# Patient Record
Sex: FEMALE | Race: WHITE | NOT HISPANIC OR LATINO | Employment: PART TIME | URBAN - METROPOLITAN AREA
[De-identification: names, ages, dates, MRNs, and addresses within clinical notes are randomized per-mention and may not be internally consistent; named-entity substitution may affect disease eponyms.]

---

## 2023-08-20 ENCOUNTER — HOSPITAL ENCOUNTER (EMERGENCY)
Facility: HOSPITAL | Age: 53
Discharge: HOME | End: 2023-08-20
Attending: STUDENT IN AN ORGANIZED HEALTH CARE EDUCATION/TRAINING PROGRAM

## 2023-08-20 VITALS
SYSTOLIC BLOOD PRESSURE: 127 MMHG | HEIGHT: 60 IN | OXYGEN SATURATION: 96 % | WEIGHT: 150 LBS | HEART RATE: 83 BPM | DIASTOLIC BLOOD PRESSURE: 99 MMHG | BODY MASS INDEX: 29.45 KG/M2 | RESPIRATION RATE: 18 BRPM | TEMPERATURE: 98 F

## 2023-08-20 DIAGNOSIS — R53.1 WEAKNESS: ICD-10-CM

## 2023-08-20 DIAGNOSIS — R11.2 NAUSEA AND VOMITING, UNSPECIFIED VOMITING TYPE: ICD-10-CM

## 2023-08-20 DIAGNOSIS — N39.0 URINARY TRACT INFECTION WITHOUT HEMATURIA, SITE UNSPECIFIED: Primary | ICD-10-CM

## 2023-08-20 LAB
ALBUMIN SERPL-MCNC: 4.4 G/DL (ref 3.5–5)
ALBUMIN/GLOB SERPL: 1.6 RATIO (ref 1.1–2)
ALP SERPL-CCNC: 71 UNIT/L (ref 40–150)
ALT SERPL-CCNC: 16 UNIT/L (ref 0–55)
APPEARANCE UR: CLEAR
AST SERPL-CCNC: 13 UNIT/L (ref 5–34)
BACTERIA #/AREA URNS AUTO: ABNORMAL /HPF
BASOPHILS # BLD AUTO: 0.03 X10(3)/MCL
BASOPHILS NFR BLD AUTO: 0.3 %
BILIRUB SERPL-MCNC: 0.5 MG/DL
BILIRUB UR QL STRIP.AUTO: NEGATIVE
BUN SERPL-MCNC: 14.3 MG/DL (ref 9.8–20.1)
CALCIUM SERPL-MCNC: 9.9 MG/DL (ref 8.4–10.2)
CHLORIDE SERPL-SCNC: 107 MMOL/L (ref 98–107)
CO2 SERPL-SCNC: 21 MMOL/L (ref 22–29)
COLOR UR: YELLOW
CREAT SERPL-MCNC: 0.89 MG/DL (ref 0.55–1.02)
EOSINOPHIL # BLD AUTO: 0.04 X10(3)/MCL (ref 0–0.9)
EOSINOPHIL NFR BLD AUTO: 0.5 %
ERYTHROCYTE [DISTWIDTH] IN BLOOD BY AUTOMATED COUNT: 13.2 % (ref 11.5–17)
FLUAV AG UPPER RESP QL IA.RAPID: NOT DETECTED
FLUBV AG UPPER RESP QL IA.RAPID: NOT DETECTED
GFR SERPLBLD CREATININE-BSD FMLA CKD-EPI: >60 MLS/MIN/1.73/M2
GLOBULIN SER-MCNC: 2.8 GM/DL (ref 2.4–3.5)
GLUCOSE SERPL-MCNC: 91 MG/DL (ref 74–100)
GLUCOSE UR QL STRIP.AUTO: NEGATIVE
HCT VFR BLD AUTO: 40.8 % (ref 37–47)
HGB BLD-MCNC: 14 G/DL (ref 12–16)
IMM GRANULOCYTES # BLD AUTO: 0.02 X10(3)/MCL (ref 0–0.04)
IMM GRANULOCYTES NFR BLD AUTO: 0.2 %
KETONES UR QL STRIP.AUTO: NEGATIVE
LEUKOCYTE ESTERASE UR QL STRIP.AUTO: ABNORMAL
LIPASE SERPL-CCNC: 16 U/L
LYMPHOCYTES # BLD AUTO: 1.76 X10(3)/MCL (ref 0.6–4.6)
LYMPHOCYTES NFR BLD AUTO: 20.2 %
MAGNESIUM SERPL-MCNC: 2 MG/DL (ref 1.6–2.6)
MCH RBC QN AUTO: 30.5 PG (ref 27–31)
MCHC RBC AUTO-ENTMCNC: 34.3 G/DL (ref 33–36)
MCV RBC AUTO: 88.9 FL (ref 80–94)
MONOCYTES # BLD AUTO: 0.56 X10(3)/MCL (ref 0.1–1.3)
MONOCYTES NFR BLD AUTO: 6.4 %
NEUTROPHILS # BLD AUTO: 6.31 X10(3)/MCL (ref 2.1–9.2)
NEUTROPHILS NFR BLD AUTO: 72.4 %
NITRITE UR QL STRIP.AUTO: NEGATIVE
NRBC BLD AUTO-RTO: 0 %
PH UR STRIP.AUTO: 5.5 [PH]
PLATELET # BLD AUTO: 292 X10(3)/MCL (ref 130–400)
PMV BLD AUTO: 12.2 FL (ref 7.4–10.4)
POTASSIUM SERPL-SCNC: 3.9 MMOL/L (ref 3.5–5.1)
PROT SERPL-MCNC: 7.2 GM/DL (ref 6.4–8.3)
PROT UR QL STRIP.AUTO: NEGATIVE
RBC # BLD AUTO: 4.59 X10(6)/MCL (ref 4.2–5.4)
RBC #/AREA URNS AUTO: <5 /HPF
RBC UR QL AUTO: NEGATIVE
SARS-COV-2 RNA RESP QL NAA+PROBE: NOT DETECTED
SODIUM SERPL-SCNC: 136 MMOL/L (ref 136–145)
SP GR UR STRIP.AUTO: 1.01 (ref 1–1.03)
SQUAMOUS #/AREA URNS AUTO: <5 /HPF
T4 FREE SERPL-MCNC: 1.11 NG/DL (ref 0.7–1.48)
TROPONIN I SERPL-MCNC: <0.01 NG/ML (ref 0–0.04)
TSH SERPL-ACNC: 0.18 UIU/ML (ref 0.35–4.94)
UROBILINOGEN UR STRIP-ACNC: 0.2
WBC # SPEC AUTO: 8.72 X10(3)/MCL (ref 4.5–11.5)
WBC #/AREA URNS AUTO: 55 /HPF

## 2023-08-20 PROCEDURE — 93010 EKG 12-LEAD: ICD-10-PCS | Mod: ,,, | Performed by: INTERNAL MEDICINE

## 2023-08-20 PROCEDURE — 81001 URINALYSIS AUTO W/SCOPE: CPT | Performed by: NURSE PRACTITIONER

## 2023-08-20 PROCEDURE — 0240U COVID/FLU A&B PCR: CPT | Performed by: PHYSICIAN ASSISTANT

## 2023-08-20 PROCEDURE — 96361 HYDRATE IV INFUSION ADD-ON: CPT

## 2023-08-20 PROCEDURE — 82962 GLUCOSE BLOOD TEST: CPT

## 2023-08-20 PROCEDURE — 85025 COMPLETE CBC W/AUTO DIFF WBC: CPT | Performed by: NURSE PRACTITIONER

## 2023-08-20 PROCEDURE — 84484 ASSAY OF TROPONIN QUANT: CPT | Performed by: NURSE PRACTITIONER

## 2023-08-20 PROCEDURE — 96375 TX/PRO/DX INJ NEW DRUG ADDON: CPT

## 2023-08-20 PROCEDURE — 84439 ASSAY OF FREE THYROXINE: CPT | Performed by: NURSE PRACTITIONER

## 2023-08-20 PROCEDURE — 80053 COMPREHEN METABOLIC PANEL: CPT | Performed by: NURSE PRACTITIONER

## 2023-08-20 PROCEDURE — 63600175 PHARM REV CODE 636 W HCPCS: Performed by: PHYSICIAN ASSISTANT

## 2023-08-20 PROCEDURE — 96372 THER/PROPH/DIAG INJ SC/IM: CPT | Performed by: PHYSICIAN ASSISTANT

## 2023-08-20 PROCEDURE — 25000003 PHARM REV CODE 250: Performed by: PHYSICIAN ASSISTANT

## 2023-08-20 PROCEDURE — 25000003 PHARM REV CODE 250: Performed by: NURSE PRACTITIONER

## 2023-08-20 PROCEDURE — 84443 ASSAY THYROID STIM HORMONE: CPT | Performed by: NURSE PRACTITIONER

## 2023-08-20 PROCEDURE — 83735 ASSAY OF MAGNESIUM: CPT | Performed by: NURSE PRACTITIONER

## 2023-08-20 PROCEDURE — 96365 THER/PROPH/DIAG IV INF INIT: CPT

## 2023-08-20 PROCEDURE — 83690 ASSAY OF LIPASE: CPT | Performed by: NURSE PRACTITIONER

## 2023-08-20 PROCEDURE — 99285 EMERGENCY DEPT VISIT HI MDM: CPT | Mod: 25

## 2023-08-20 PROCEDURE — 93005 ELECTROCARDIOGRAM TRACING: CPT

## 2023-08-20 PROCEDURE — 87088 URINE BACTERIA CULTURE: CPT | Performed by: NURSE PRACTITIONER

## 2023-08-20 PROCEDURE — 93010 ELECTROCARDIOGRAM REPORT: CPT | Mod: ,,, | Performed by: INTERNAL MEDICINE

## 2023-08-20 RX ORDER — CIPROFLOXACIN 500 MG/1
500 TABLET ORAL 2 TIMES DAILY
Qty: 14 TABLET | Refills: 0 | Status: SHIPPED | OUTPATIENT
Start: 2023-08-20 | End: 2023-08-27

## 2023-08-20 RX ORDER — DICYCLOMINE HYDROCHLORIDE 10 MG/ML
20 INJECTION INTRAMUSCULAR
Status: COMPLETED | OUTPATIENT
Start: 2023-08-20 | End: 2023-08-20

## 2023-08-20 RX ORDER — ONDANSETRON 4 MG/1
4 TABLET, ORALLY DISINTEGRATING ORAL EVERY 8 HOURS PRN
Qty: 20 TABLET | Refills: 0 | Status: SHIPPED | OUTPATIENT
Start: 2023-08-20

## 2023-08-20 RX ORDER — DICYCLOMINE HYDROCHLORIDE 20 MG/1
20 TABLET ORAL 4 TIMES DAILY
Qty: 28 TABLET | Refills: 0 | Status: SHIPPED | OUTPATIENT
Start: 2023-08-20 | End: 2023-08-27

## 2023-08-20 RX ORDER — CIPROFLOXACIN 2 MG/ML
400 INJECTION, SOLUTION INTRAVENOUS
Status: COMPLETED | OUTPATIENT
Start: 2023-08-20 | End: 2023-08-20

## 2023-08-20 RX ORDER — FAMOTIDINE 10 MG/ML
40 INJECTION INTRAVENOUS
Status: COMPLETED | OUTPATIENT
Start: 2023-08-20 | End: 2023-08-20

## 2023-08-20 RX ADMIN — FAMOTIDINE 40 MG: 10 INJECTION, SOLUTION INTRAVENOUS at 03:08

## 2023-08-20 RX ADMIN — DICYCLOMINE HYDROCHLORIDE 20 MG: 20 INJECTION, SOLUTION INTRAMUSCULAR at 03:08

## 2023-08-20 RX ADMIN — SODIUM CHLORIDE 1000 ML: 9 INJECTION, SOLUTION INTRAVENOUS at 02:08

## 2023-08-20 RX ADMIN — CIPROFLOXACIN 400 MG: 400 INJECTION, SOLUTION INTRAVENOUS at 04:08

## 2023-08-20 NOTE — ED PROVIDER NOTES
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 Pt denies pain and did not eat today.PMH of hypothyroid,  in triage      53-year-old female presents to ED for evaluation nausea and vomiting since waking up this morning.  Complains of upper abdominal pain.  Patient reports that she went to work today where she began to feel tired, weak and dizzy.  States that she fell overheated.  Reports that she has not eaten today.  Patient states that she is recently not been eating as much after being started on semaglutide for the past several months.  Patient states that she is been having intermittent chills.  Reports last bowel movement yesterday normal.  Denies any urinary complaints.  Denies any trauma or injury to her head.  Denies any sick contacts.    The history is provided by the patient and the spouse. No  was used.     Review of patient's allergies indicates:   Allergen Reactions    Penicillins Hives     No past medical history on file.  No past surgical history on file.  No family history on file.     Review of Systems   Constitutional:  Negative for chills, fatigue and fever.   Respiratory:  Negative for shortness of breath.    Cardiovascular:  Negative for chest pain.   Gastrointestinal:  Positive for abdominal pain, nausea and vomiting. Negative for diarrhea.   Genitourinary:  Negative for dysuria, flank pain, frequency, pelvic pain and urgency.   Musculoskeletal:  Negative for myalgias.   All other systems reviewed and are negative.      Physical Exam     Initial Vitals [08/20/23 1243]   BP Pulse Resp Temp SpO2   (!) 154/85 96 20 97.9 °F (36.6 °C) 100 %      MAP       --         Physical Exam    Nursing note and vitals reviewed.  Constitutional: She appears well-developed and well-nourished.   HENT:   Head: Normocephalic and atraumatic.    Right Ear: Tympanic membrane and external ear normal.   Left Ear: Tympanic membrane and external ear normal.   Mouth/Throat: Uvula is midline, oropharynx is clear and moist and mucous membranes are normal. No trismus in the jaw. No uvula swelling. No oropharyngeal exudate, posterior oropharyngeal edema or posterior oropharyngeal erythema.   Eyes: Conjunctivae are normal. Pupils are equal, round, and reactive to light.   Neck: Neck supple.   Normal range of motion.  Cardiovascular:  Normal rate, regular rhythm and normal heart sounds.           Pulmonary/Chest: Breath sounds normal. She has no wheezes. She has no rhonchi. She has no rales.   Abdominal: Abdomen is soft. Bowel sounds are normal. There is abdominal tenderness in the right upper quadrant and epigastric area. There is no rebound and no guarding.   Musculoskeletal:         General: Normal range of motion.      Cervical back: Normal range of motion and neck supple.     Neurological: She is alert and oriented to person, place, and time.   Skin: Skin is warm and dry.   Psychiatric: She has a normal mood and affect.         ED Course   Procedures  Labs Reviewed   CBC WITH DIFFERENTIAL - Abnormal; Notable for the following components:       Result Value    MPV 12.2 (*)     All other components within normal limits   COMPREHENSIVE METABOLIC PANEL - Abnormal; Notable for the following components:    Carbon Dioxide 21 (*)     All other components within normal limits   URINALYSIS, REFLEX TO URINE CULTURE - Abnormal; Notable for the following components:    Leukocyte Esterase, UA 3+ (*)     All other components within normal limits   TSH - Abnormal; Notable for the following components:    Thyroid Stimulating Hormone 0.178 (*)     All other components within normal limits   URINALYSIS, MICROSCOPIC - Abnormal; Notable for the following components:    WBC, UA 55 (*)     Bacteria, UA 1+ (*)     All other components within normal limits   MAGNESIUM - Normal    TROPONIN I - Normal   LIPASE - Normal   T4, FREE - Normal   COVID/FLU A&B PCR - Normal    Narrative:     The Xpert Xpress SARS-CoV-2/FLU/RSV plus is a rapid, multiplexed real-time PCR test intended for the simultaneous qualitative detection and differentiation of SARS-CoV-2, Influenza A, Influenza B, and respiratory syncytial virus (RSV) viral RNA in either nasopharyngeal swab or nasal swab specimens.         CULTURE, URINE     EKG Readings: (Independently Interpreted)   Initial Reading: No STEMI. Rhythm: Normal Sinus Rhythm. Heart Rate: 87. Ectopy: No Ectopy. Conduction: Normal. ST Segments: Normal ST Segments. T Waves: Normal. Clinical Impression: Normal Sinus Rhythm Other Impression: Normal sinus rhythm at rate of 87, no ischemia or arrhythmia noted       Imaging Results              US Abdomen Limited (Final result)  Result time 08/20/23 15:58:24      Final result by Dayron Moncada MD (08/20/23 15:58:24)                   Impression:      Findings suggestive hepatic steatosis.      Electronically signed by: Dayron Moncada MD  Date:    08/20/2023  Time:    15:58               Narrative:    EXAMINATION:  US ABDOMEN LIMITED    CLINICAL HISTORY:  RUQ abd pain;    TECHNIQUE:  Multiple sonographic images of the right upper quadrant obtained by department sonographer.    COMPARISON:  None    FINDINGS:  Liver demonstrates increased echogenicity with a smooth capsule.  No focal hepatic lesion.  No intrahepatic duct dilatation the portal vein is patent with hepatopetal flow.  Gallbladder is present without evidence for pericholecystic fluid, gallbladder wall thickening, or cholelithiasis.  The sonographic Gregg sign is negative.  The common duct measures 4 mm.    The right kidney measures 9.2 cm in length.  No evidence for hydronephrosis or calculus.  The parenchyma demonstrates increased echogenicity.    The visualized pancreas, abdominal aorta, IVC within normal limits.                                       CT Head  Without Contrast (Final result)  Result time 08/20/23 13:30:29      Final result by Dayron Moncada MD (08/20/23 13:30:29)                   Impression:      No acute abnormality.  Changes of microangiopathy.      Electronically signed by: Dayron Moncada MD  Date:    08/20/2023  Time:    13:30               Narrative:    EXAMINATION:  CT HEAD WITHOUT CONTRAST    CLINICAL HISTORY:  Transient ischemic attack (TIA);    TECHNIQUE:  Low dose axial CT images obtained throughout the head without intravenous contrast. Sagittal and coronal reconstructions were performed.  An automated dose exposure technique was utilized.  This limits radiation does the patient.    COMPARISON:  None.    FINDINGS:  Intracranial compartment:    Ventricles and sulci are normal in size for age without evidence of hydrocephalus. No extra-axial blood or fluid collections.    The brain parenchyma appears normal. No parenchymal mass, hemorrhage, edema or major vascular distribution infarct.    Skull/extracranial contents (limited evaluation): No fracture. Mastoid air cells and paranasal sinuses are essentially clear.                                       Medications   sodium chloride 0.9% bolus 1,000 mL 1,000 mL (0 mLs Intravenous Stopped 8/20/23 1527)   famotidine (PF) injection 40 mg (40 mg Intravenous Given 8/20/23 1550)   dicyclomine injection 20 mg (20 mg Intramuscular Given 8/20/23 1518)   ciprofloxacin (CIPRO)400mg/200ml D5W IVPB 400 mg (0 mg Intravenous Stopped 8/20/23 1732)     Medical Decision Making  Patient presents to ED for evaluation of nausea and vomiting since waking up this morning.  Complains of upper abdominal pain.  Patient states she began to feel weak and dizzy at work while in the heat.  Denies any urinary complaints.  Denies any fever.    Amount and/or Complexity of Data Reviewed  Radiology: ordered.  Discussion of management or test interpretation with external provider(s): Patient afebrile and in no acute distress.  Remained  hemodynamically stable throughout ED stay.  Labs and imaging unremarkable.  UA positive with signs of infection.  Due to allergies given Cipro here will discharge home with Cipro to treat for urinary tract infection.  Will treat symptomatically for nausea and vomiting.  Discussed making sure she is eating and drinking.  Strict return ED precautions given.  Patient verbalizes understanding and agrees with plan of care.  Patient stable for discharge.    Risk  Prescription drug management.                               Clinical Impression:   Final diagnoses:  [R53.1] Weakness  [N39.0] Urinary tract infection without hematuria, site unspecified (Primary)  [R11.2] Nausea and vomiting, unspecified vomiting type        ED Disposition Condition    Discharge Stable          ED Prescriptions       Medication Sig Dispense Start Date End Date Auth. Provider    ondansetron (ZOFRAN-ODT) 4 MG TbDL Take 1 tablet (4 mg total) by mouth every 8 (eight) hours as needed (nausea and vomiting). 20 tablet 8/20/2023 -- Yari Live PA    dicyclomine (BENTYL) 20 mg tablet Take 1 tablet (20 mg total) by mouth 4 (four) times daily. for 7 days 28 tablet 8/20/2023 8/27/2023 Yari Live PA    ciprofloxacin HCl (CIPRO) 500 MG tablet Take 1 tablet (500 mg total) by mouth 2 (two) times daily. for 7 days 14 tablet 8/20/2023 8/27/2023 Yari Live PA          Follow-up Information       Follow up With Specialties Details Why Contact Info    PCP  In 1 week As needed, If you do not have a PCP you may call [phone] to help get set up If you do not have a PCP you may call [phone] to help get set up.             Yari Live PA  08/21/23 0020

## 2023-08-20 NOTE — FIRST PROVIDER EVALUATION
Medical screening examination initiated.  I have conducted a focused provider triage encounter, findings are as follows:    Brief history of present illness:  54 y/o female who presents with waking up this AM and feeling nauseated without pain. States vomited couple times today. Took zofran 2mg today. No diarrhea. Weakness and dizziness.     There were no vitals filed for this visit.    Pertinent physical exam:  alert, appears to not feel well, in wheel chair    Brief workup plan:  ekg, labs, urine, fluids.     Preliminary workup initiated; this workup will be continued and followed by the physician or advanced practice provider that is assigned to the patient when roomed.

## 2023-08-20 NOTE — DISCHARGE INSTRUCTIONS
Drink plenty of fluids.  Use Zofran for nausea and vomiting.  Take Bentyl for abdominal pain.  Take full course of antibiotics.  Follow up with PCP in 5-7 days as needed.

## 2023-08-20 NOTE — Clinical Note
"Emilee"Romain Lacey was seen and treated in our emergency department on 8/20/2023.  She may return to work on 08/22/2023.       If you have any questions or concerns, please don't hesitate to call.      Yari Live PA"

## 2023-08-22 LAB
BACTERIA UR CULT: NORMAL
POCT GLUCOSE: 139 MG/DL (ref 70–110)